# Patient Record
Sex: FEMALE | Race: OTHER | HISPANIC OR LATINO | URBAN - METROPOLITAN AREA
[De-identification: names, ages, dates, MRNs, and addresses within clinical notes are randomized per-mention and may not be internally consistent; named-entity substitution may affect disease eponyms.]

---

## 2019-04-14 ENCOUNTER — EMERGENCY (EMERGENCY)
Facility: HOSPITAL | Age: 24
LOS: 0 days | Discharge: HOME | End: 2019-04-15
Attending: EMERGENCY MEDICINE | Admitting: EMERGENCY MEDICINE
Payer: COMMERCIAL

## 2019-04-14 VITALS
DIASTOLIC BLOOD PRESSURE: 82 MMHG | HEART RATE: 101 BPM | WEIGHT: 110.01 LBS | OXYGEN SATURATION: 98 % | HEIGHT: 65 IN | TEMPERATURE: 98 F | RESPIRATION RATE: 20 BRPM | SYSTOLIC BLOOD PRESSURE: 124 MMHG

## 2019-04-14 DIAGNOSIS — M62.81 MUSCLE WEAKNESS (GENERALIZED): ICD-10-CM
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
## 2019-04-14 DIAGNOSIS — S40.022A CONTUSION OF LEFT UPPER ARM, INITIAL ENCOUNTER: ICD-10-CM

## 2019-04-14 DIAGNOSIS — S50.12XA CONTUSION OF LEFT FOREARM, INITIAL ENCOUNTER: ICD-10-CM
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
## 2019-04-14 DIAGNOSIS — R20.2 PARESTHESIA OF SKIN: ICD-10-CM
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
## 2019-04-14 PROCEDURE — 99284 EMERGENCY DEPT VISIT MOD MDM: CPT

## 2019-04-15 PROCEDURE — 72125 CT NECK SPINE W/O DYE: CPT | Mod: 26

## 2019-04-15 NOTE — ED PROVIDER NOTE - NSFOLLOWUPCLINICS_GEN_ALL_ED_FT
[facility]  Rehabilitation  [address] [place] [address]  Phone: [phone]  Fax:   Follow Up Time: 1-3 Days

## 2019-04-15 NOTE — ED PROVIDER NOTE - NS ED ROS FT
Review of Systems    Constitutional: (-) fever/ chills  Eyes/ENT: (-) blurry vision,   Cardiovascular: (-) chest pain, (-) syncope   Respiratory: (-) cough, (-) shortness of breath  Gastrointestinal: (-) vomiting, (-) diarrhea (-) abdominal pain  Musculoskeletal: (-) neck pain, (-) back pain,   Integumentary: (-) rash, (+) swelling to left forearm   Neurological: (-) headache, (-) altered mental status +tingling to left arm   Psychiatric: (-) hallucinations or depression

## 2019-04-15 NOTE — ED PROVIDER NOTE - PHYSICAL EXAMINATION
Vital Signs: I have reviewed the initial vital signs.  Constitutional: well-nourished, no acute distress,  Eyes: PERRLA, EOMI, clear conjunctiva  ENT: MMM,   Cardiovascular: regular rate, regular rhythm,  Respiratory: unlabored respiratory effort, clear to auscultation bilaterally  Gastrointestinal: soft, non-tender, non-distended  abdomen,  Musculoskeletal: supple neck, no cervical tenderness without step off , no bony tenderness, no trapezius tenderness   Integumentary: warm, dry, no rash, +swelling and bruising appreciated to left forearm and left upper arm   Neurologic: awake, alert, cranial nerves II-XII grossly intact, extremities’ motor and sensory functions grossly intact, no focal deficits, GCS 15,  Psychiatric: appropriate mood, appropriate affect
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
## 2019-04-15 NOTE — ED PROVIDER NOTE - CARE PROVIDER_API CALL
Killian Yeh (MD)  Orthopaedic Surgery  [address] [place] [address]  Phone: [phone]  Fax: [phone]  Follow Up Time: 1-3 Days

## 2019-04-22 NOTE — ED POST DISCHARGE NOTE - REASON FOR FOLLOW-UP
Other SEEN IN ED 4-14-19.  REQUESTING WORK NOTE TO RETURN BACK TO WORK ON 4-22-19, WILL DISCUSS WITH DR. CONTRERAS.

## 2019-04-22 NOTE — ED POST DISCHARGE NOTE - DETAILS
PATIENT CALLED TODAY 5-8-19: HER JOB WANTS EACH DAY OFF SPECIFIED IN WORK NOTE. NOW HAVE TO DO ANOTHER WORK NOTE FOR THIS REQUEST.

## 2019-04-22 NOTE — ED POST DISCHARGE NOTE - RESULT SUMMARY
DISCUSSED WITH GUERITA SAPP, HE GIVES PERMISSION FOR WORK NOTE: RETURN TO FULL DUTY WORK WITHOUT RESTRICTIONS ON 4-22-19.

## 2019-06-27 NOTE — ED ADULT NURSE NOTE - NSFALLRSKHARMRISK_ED_ALL_ED
----- Message from Keila Peres MD sent at 6/26/2019  8:26 PM CDT -----  You still have a UTI - pls take augmentin and recheck in 2 wks   Your kidney function is decreased . This can happen with age - let's check a kidney US and a  24 hr urine test for further evaluation   no